# Patient Record
Sex: FEMALE | Race: WHITE | URBAN - METROPOLITAN AREA
[De-identification: names, ages, dates, MRNs, and addresses within clinical notes are randomized per-mention and may not be internally consistent; named-entity substitution may affect disease eponyms.]

---

## 2023-02-16 ENCOUNTER — OFFICE VISIT (OUTPATIENT)
Facility: LOCATION | Age: 58
End: 2023-02-16
Payer: COMMERCIAL

## 2023-02-16 DIAGNOSIS — H04.123 TEAR FILM INSUFFICIENCY OF BILATERAL LACRIMAL GLANDS: Primary | ICD-10-CM

## 2023-02-16 DIAGNOSIS — H25.13 AGE-RELATED NUCLEAR CATARACT, BILATERAL: ICD-10-CM

## 2023-02-16 PROCEDURE — 92004 COMPRE OPH EXAM NEW PT 1/>: CPT | Performed by: OPTOMETRIST

## 2023-02-16 RX ORDER — LIFITEGRAST 50 MG/ML
5 % SOLUTION/ DROPS OPHTHALMIC
Qty: 60 | Refills: 2 | Status: INACTIVE
Start: 2023-02-16 | End: 2023-02-16

## 2023-02-16 RX ORDER — AMLODIPINE BESYLATE 5 MG/1
5 MG TABLET ORAL
Qty: 0 | Refills: 0 | Status: ACTIVE
Start: 2023-02-16

## 2023-02-16 RX ORDER — FLUOROMETHOLONE 1 MG/ML
0.1 % SOLUTION/ DROPS OPHTHALMIC
Qty: 5 | Refills: 0 | Status: ACTIVE
Start: 2023-02-16

## 2023-02-16 ASSESSMENT — KERATOMETRY
OS: 45.00
OD: 45.75

## 2023-02-16 ASSESSMENT — VISUAL ACUITY
OD: 20/20
OS: 20/20

## 2023-02-16 ASSESSMENT — INTRAOCULAR PRESSURE
OD: 14
OS: 16

## 2023-02-16 NOTE — IMPRESSION/PLAN
Impression: Tear film insufficiency of bilateral lacrimal glands: H04.123.
- failed on AT's  PF and Gel Plan: Dry eyes account for the patient's complaints. There is no evidence of permanent changes to the cornea. Explained condition does not have a cure and will need artificial tears for maintenance. Patient reports AT alone have not improved symptoms. Discussed monitoring vs initiating dry eye treatment. Patient elects treatment. Recommend starting Flarex BID OU  SE profile of steroid tx discussed. Patient elects to continue. Patient instructed to use artificial tears 4x a day, warm compresses with a dry eye mask QHS, Ocusoft lid wipes QAM, and Omega 3/Fish oil supplementation 1,000mg BID best taken with food. Patient to call back if symptoms do not improve in  or symptoms worsen to discuss further intervention. Dry eyes account for the patient's complaints. There is no evidence of permanent changes to the cornea. Explained condition does not have a cure and will need treatment for maintenance. It is recommended to begin Xiidra BID OU. Patient advised that this medication can take up to 1 month to see full effect. Patient is encouraged to continue the use of AT BID- QID, warm compresses with a dry eye mask QHS, ocusoft lid wipes QAM and omega 3/fish oil supplementation 1,000mg BID best taken with food. Patient advised to call office if symptoms do not improve. ** Patient reports she has seen Rheumotologst in the past for possible Lupus , patient feels she may have sjorgens. Advised patient to return back to rheumotology and get a second opinion.